# Patient Record
Sex: MALE | Race: WHITE | HISPANIC OR LATINO | Employment: UNEMPLOYED | URBAN - METROPOLITAN AREA
[De-identification: names, ages, dates, MRNs, and addresses within clinical notes are randomized per-mention and may not be internally consistent; named-entity substitution may affect disease eponyms.]

---

## 2023-04-15 ENCOUNTER — APPOINTMENT (EMERGENCY)
Dept: RADIOLOGY | Facility: HOSPITAL | Age: 38
End: 2023-04-15

## 2023-04-15 ENCOUNTER — HOSPITAL ENCOUNTER (INPATIENT)
Facility: HOSPITAL | Age: 38
LOS: 1 days | Discharge: HOME | End: 2023-04-17
Attending: EMERGENCY MEDICINE | Admitting: STUDENT IN AN ORGANIZED HEALTH CARE EDUCATION/TRAINING PROGRAM

## 2023-04-15 ENCOUNTER — APPOINTMENT (EMERGENCY)
Dept: CT IMAGING | Facility: HOSPITAL | Age: 38
End: 2023-04-15

## 2023-04-15 DIAGNOSIS — S36.039A SPLENIC LACERATION, INITIAL ENCOUNTER: ICD-10-CM

## 2023-04-15 DIAGNOSIS — S37.092A: ICD-10-CM

## 2023-04-15 DIAGNOSIS — S22.49XA MULTIPLE RIB FRACTURES: Primary | ICD-10-CM

## 2023-04-15 DIAGNOSIS — J93.9 PNEUMOTHORAX: ICD-10-CM

## 2023-04-15 LAB
ALBUMIN SERPL BCP-MCNC: 5.3 G/DL (ref 3.5–5)
ALP SERPL-CCNC: 54 U/L (ref 34–104)
ALT SERPL W P-5'-P-CCNC: 67 U/L (ref 7–52)
ANION GAP SERPL CALCULATED.3IONS-SCNC: 8 MMOL/L (ref 4–13)
AST SERPL W P-5'-P-CCNC: 50 U/L (ref 13–39)
BASOPHILS # BLD AUTO: 0.03 THOUSANDS/ΜL (ref 0–0.1)
BASOPHILS NFR BLD AUTO: 0 % (ref 0–1)
BILIRUB SERPL-MCNC: 1.27 MG/DL (ref 0.2–1)
BILIRUB UR QL STRIP: NEGATIVE
BUN SERPL-MCNC: 19 MG/DL (ref 5–25)
CALCIUM SERPL-MCNC: 10.1 MG/DL (ref 8.4–10.2)
CHLORIDE SERPL-SCNC: 101 MMOL/L (ref 96–108)
CLARITY UR: CLEAR
CO2 SERPL-SCNC: 27 MMOL/L (ref 21–32)
COLOR UR: ABNORMAL
CREAT SERPL-MCNC: 1.22 MG/DL (ref 0.6–1.3)
EOSINOPHIL # BLD AUTO: 0.01 THOUSAND/ΜL (ref 0–0.61)
EOSINOPHIL NFR BLD AUTO: 0 % (ref 0–6)
ERYTHROCYTE [DISTWIDTH] IN BLOOD BY AUTOMATED COUNT: 12.3 % (ref 11.6–15.1)
GFR SERPL CREATININE-BSD FRML MDRD: 76 ML/MIN/1.73SQ M
GLUCOSE SERPL-MCNC: 107 MG/DL (ref 65–140)
GLUCOSE UR STRIP-MCNC: NEGATIVE MG/DL
HCT VFR BLD AUTO: 45.4 % (ref 36.5–49.3)
HGB BLD-MCNC: 15.1 G/DL (ref 12–17)
HGB UR QL STRIP.AUTO: ABNORMAL
IMM GRANULOCYTES # BLD AUTO: 0.06 THOUSAND/UL (ref 0–0.2)
IMM GRANULOCYTES NFR BLD AUTO: 1 % (ref 0–2)
KETONES UR STRIP-MCNC: ABNORMAL MG/DL
LEUKOCYTE ESTERASE UR QL STRIP: NEGATIVE
LIPASE SERPL-CCNC: 24 U/L (ref 11–82)
LYMPHOCYTES # BLD AUTO: 1.19 THOUSANDS/ΜL (ref 0.6–4.47)
LYMPHOCYTES NFR BLD AUTO: 13 % (ref 14–44)
MCH RBC QN AUTO: 29.3 PG (ref 26.8–34.3)
MCHC RBC AUTO-ENTMCNC: 33.3 G/DL (ref 31.4–37.4)
MCV RBC AUTO: 88 FL (ref 82–98)
MONOCYTES # BLD AUTO: 1.06 THOUSAND/ΜL (ref 0.17–1.22)
MONOCYTES NFR BLD AUTO: 12 % (ref 4–12)
NEUTROPHILS # BLD AUTO: 6.61 THOUSANDS/ΜL (ref 1.85–7.62)
NEUTS SEG NFR BLD AUTO: 74 % (ref 43–75)
NITRITE UR QL STRIP: NEGATIVE
NRBC BLD AUTO-RTO: 0 /100 WBCS
PH UR STRIP.AUTO: 5.5 [PH]
PLATELET # BLD AUTO: 208 THOUSANDS/UL (ref 149–390)
PMV BLD AUTO: 9.7 FL (ref 8.9–12.7)
POTASSIUM SERPL-SCNC: 4 MMOL/L (ref 3.5–5.3)
PROT SERPL-MCNC: 8.4 G/DL (ref 6.4–8.4)
PROT UR STRIP-MCNC: ABNORMAL MG/DL
RBC # BLD AUTO: 5.16 MILLION/UL (ref 3.88–5.62)
SODIUM SERPL-SCNC: 136 MMOL/L (ref 135–147)
SP GR UR STRIP.AUTO: 1.02 (ref 1–1.03)
UROBILINOGEN UR STRIP-ACNC: 2 MG/DL
WBC # BLD AUTO: 8.96 THOUSAND/UL (ref 4.31–10.16)

## 2023-04-15 RX ORDER — KETOROLAC TROMETHAMINE 30 MG/ML
15 INJECTION, SOLUTION INTRAMUSCULAR; INTRAVENOUS ONCE
Status: DISCONTINUED | OUTPATIENT
Start: 2023-04-15 | End: 2023-04-15

## 2023-04-15 RX ORDER — KETOROLAC TROMETHAMINE 30 MG/ML
15 INJECTION, SOLUTION INTRAMUSCULAR; INTRAVENOUS ONCE
Status: COMPLETED | OUTPATIENT
Start: 2023-04-15 | End: 2023-04-15

## 2023-04-15 RX ORDER — ACETAMINOPHEN 325 MG/1
975 TABLET ORAL ONCE
Status: COMPLETED | OUTPATIENT
Start: 2023-04-15 | End: 2023-04-15

## 2023-04-15 RX ADMIN — IOHEXOL 100 ML: 350 INJECTION, SOLUTION INTRAVENOUS at 23:47

## 2023-04-15 RX ADMIN — SODIUM CHLORIDE 1000 ML: 0.9 INJECTION, SOLUTION INTRAVENOUS at 23:00

## 2023-04-15 RX ADMIN — ACETAMINOPHEN 975 MG: 325 TABLET ORAL at 23:03

## 2023-04-15 RX ADMIN — KETOROLAC TROMETHAMINE 15 MG: 30 INJECTION, SOLUTION INTRAMUSCULAR; INTRAVENOUS at 23:35

## 2023-04-16 PROBLEM — S36.039A SPLEEN LACERATION, INITIAL ENCOUNTER: Status: ACTIVE | Noted: 2023-04-16

## 2023-04-16 PROBLEM — R31.9 HEMATURIA: Status: ACTIVE | Noted: 2023-04-16

## 2023-04-16 PROBLEM — S22.42XA MULTIPLE FRACTURES OF RIBS, LEFT SIDE, INITIAL ENCOUNTER FOR CLOSED FRACTURE: Status: ACTIVE | Noted: 2023-04-16

## 2023-04-16 PROBLEM — S27.0XXA TRAUMATIC PNEUMOTHORAX: Status: ACTIVE | Noted: 2023-04-16
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
## 2023-04-16 LAB
ABO GROUP BLD: NORMAL
ABO GROUP BLD: NORMAL
ALBUMIN SERPL BCP-MCNC: 4.3 G/DL (ref 3.5–5)
ALP SERPL-CCNC: 45 U/L (ref 34–104)
ALT SERPL W P-5'-P-CCNC: 49 U/L (ref 7–52)
ANION GAP SERPL CALCULATED.3IONS-SCNC: 6 MMOL/L (ref 4–13)
APTT PPP: 27 SECONDS (ref 23–37)
AST SERPL W P-5'-P-CCNC: 36 U/L (ref 13–39)
BACTERIA UR QL AUTO: ABNORMAL /HPF
BASOPHILS # BLD AUTO: 0.02 THOUSANDS/ΜL (ref 0–0.1)
BASOPHILS NFR BLD AUTO: 0 % (ref 0–1)
BILIRUB SERPL-MCNC: 1.57 MG/DL (ref 0.2–1)
BLD GP AB SCN SERPL QL: NEGATIVE
BUN SERPL-MCNC: 17 MG/DL (ref 5–25)
CALCIUM SERPL-MCNC: 9 MG/DL (ref 8.4–10.2)
CHLORIDE SERPL-SCNC: 105 MMOL/L (ref 96–108)
CO2 SERPL-SCNC: 26 MMOL/L (ref 21–32)
CREAT SERPL-MCNC: 1.04 MG/DL (ref 0.6–1.3)
EOSINOPHIL # BLD AUTO: 0.04 THOUSAND/ΜL (ref 0–0.61)
EOSINOPHIL NFR BLD AUTO: 1 % (ref 0–6)
ERYTHROCYTE [DISTWIDTH] IN BLOOD BY AUTOMATED COUNT: 12.6 % (ref 11.6–15.1)
GFR SERPL CREATININE-BSD FRML MDRD: 92 ML/MIN/1.73SQ M
GLUCOSE SERPL-MCNC: 105 MG/DL (ref 65–140)
HCT VFR BLD AUTO: 40.1 % (ref 36.5–49.3)
HCT VFR BLD AUTO: 40.4 % (ref 36.5–49.3)
HCT VFR BLD AUTO: 40.5 % (ref 36.5–49.3)
HGB BLD-MCNC: 13.3 G/DL (ref 12–17)
HGB BLD-MCNC: 13.4 G/DL (ref 12–17)
HGB BLD-MCNC: 13.5 G/DL (ref 12–17)
IMM GRANULOCYTES # BLD AUTO: 0.02 THOUSAND/UL (ref 0–0.2)
IMM GRANULOCYTES NFR BLD AUTO: 0 % (ref 0–2)
INR PPP: 0.98 (ref 0.84–1.19)
LYMPHOCYTES # BLD AUTO: 1.57 THOUSANDS/ΜL (ref 0.6–4.47)
LYMPHOCYTES NFR BLD AUTO: 27 % (ref 14–44)
MAGNESIUM SERPL-MCNC: 2.1 MG/DL (ref 1.9–2.7)
MCH RBC QN AUTO: 29.3 PG (ref 26.8–34.3)
MCHC RBC AUTO-ENTMCNC: 33.2 G/DL (ref 31.4–37.4)
MCV RBC AUTO: 88 FL (ref 82–98)
MONOCYTES # BLD AUTO: 0.85 THOUSAND/ΜL (ref 0.17–1.22)
MONOCYTES NFR BLD AUTO: 15 % (ref 4–12)
MUCOUS THREADS UR QL AUTO: ABNORMAL
NEUTROPHILS # BLD AUTO: 3.29 THOUSANDS/ΜL (ref 1.85–7.62)
NEUTS SEG NFR BLD AUTO: 57 % (ref 43–75)
NON-SQ EPI CELLS URNS QL MICRO: ABNORMAL /HPF
NRBC BLD AUTO-RTO: 0 /100 WBCS
PHOSPHATE SERPL-MCNC: 3.8 MG/DL (ref 2.7–4.5)
PLATELET # BLD AUTO: 177 THOUSANDS/UL (ref 149–390)
PMV BLD AUTO: 9.4 FL (ref 8.9–12.7)
POTASSIUM SERPL-SCNC: 3.9 MMOL/L (ref 3.5–5.3)
PROT SERPL-MCNC: 6.7 G/DL (ref 6.4–8.4)
PROTHROMBIN TIME: 13.2 SECONDS (ref 11.6–14.5)
RBC # BLD AUTO: 4.54 MILLION/UL (ref 3.88–5.62)
RBC #/AREA URNS AUTO: ABNORMAL /HPF
RH BLD: POSITIVE
RH BLD: POSITIVE
SODIUM SERPL-SCNC: 137 MMOL/L (ref 135–147)
SPECIMEN EXPIRATION DATE: NORMAL
WBC # BLD AUTO: 5.79 THOUSAND/UL (ref 4.31–10.16)
WBC #/AREA URNS AUTO: ABNORMAL /HPF

## 2023-04-16 RX ORDER — OXYCODONE HYDROCHLORIDE 5 MG/1
5 TABLET ORAL EVERY 4 HOURS PRN
Status: DISCONTINUED | OUTPATIENT
Start: 2023-04-16 | End: 2023-04-17 | Stop reason: HOSPADM

## 2023-04-16 RX ORDER — METHOCARBAMOL 500 MG/1
500 TABLET, FILM COATED ORAL EVERY 6 HOURS PRN
Status: DISCONTINUED | OUTPATIENT
Start: 2023-04-16 | End: 2023-04-17 | Stop reason: HOSPADM

## 2023-04-16 RX ORDER — OXYCODONE HYDROCHLORIDE 5 MG/1
5 TABLET ORAL EVERY 4 HOURS PRN
Status: DISCONTINUED | OUTPATIENT
Start: 2023-04-16 | End: 2023-04-16

## 2023-04-16 RX ORDER — ONDANSETRON 2 MG/ML
4 INJECTION INTRAMUSCULAR; INTRAVENOUS EVERY 4 HOURS PRN
Status: DISCONTINUED | OUTPATIENT
Start: 2023-04-16 | End: 2023-04-17 | Stop reason: HOSPADM

## 2023-04-16 RX ORDER — AMOXICILLIN 250 MG
1 CAPSULE ORAL
Status: DISCONTINUED | OUTPATIENT
Start: 2023-04-16 | End: 2023-04-17 | Stop reason: HOSPADM

## 2023-04-16 RX ORDER — HYDROMORPHONE HCL IN WATER/PF 6 MG/30 ML
0.2 PATIENT CONTROLLED ANALGESIA SYRINGE INTRAVENOUS EVERY 2 HOUR PRN
Status: DISCONTINUED | OUTPATIENT
Start: 2023-04-16 | End: 2023-04-17 | Stop reason: HOSPADM

## 2023-04-16 RX ORDER — ENOXAPARIN SODIUM 100 MG/ML
30 INJECTION SUBCUTANEOUS EVERY 12 HOURS SCHEDULED
Status: DISCONTINUED | OUTPATIENT
Start: 2023-04-16 | End: 2023-04-17 | Stop reason: HOSPADM

## 2023-04-16 RX ORDER — CHLORHEXIDINE GLUCONATE 0.12 MG/ML
15 RINSE ORAL EVERY 12 HOURS SCHEDULED
Status: DISCONTINUED | OUTPATIENT
Start: 2023-04-16 | End: 2023-04-16

## 2023-04-16 RX ORDER — ACETAMINOPHEN 325 MG/1
975 TABLET ORAL EVERY 8 HOURS SCHEDULED
Status: DISCONTINUED | OUTPATIENT
Start: 2023-04-16 | End: 2023-04-17 | Stop reason: HOSPADM

## 2023-04-16 RX ADMIN — METHOCARBAMOL 500 MG: 500 TABLET ORAL at 10:27

## 2023-04-16 RX ADMIN — ACETAMINOPHEN 975 MG: 325 TABLET ORAL at 22:40

## 2023-04-16 RX ADMIN — SENNOSIDES AND DOCUSATE SODIUM 1 TABLET: 8.6; 5 TABLET ORAL at 22:41

## 2023-04-16 RX ADMIN — ENOXAPARIN SODIUM 30 MG: 30 INJECTION SUBCUTANEOUS at 22:40

## 2023-04-16 RX ADMIN — ACETAMINOPHEN 975 MG: 325 TABLET ORAL at 05:49

## 2023-04-16 RX ADMIN — Medication 2.5 MG: at 10:27

## 2023-04-16 RX ADMIN — Medication 2.5 MG: at 22:40

## 2023-04-16 RX ADMIN — CHLORHEXIDINE GLUCONATE 0.12% ORAL RINSE 15 ML: 1.2 LIQUID ORAL at 08:57

## 2023-04-16 RX ADMIN — ACETAMINOPHEN 975 MG: 325 TABLET ORAL at 14:10

## 2023-04-16 NOTE — ED ATTENDING ATTESTATION
4/15/2023  I, Keny Chacon MD, saw and evaluated the patient  I have discussed the patient with the resident/non-physician practitioner and agree with the resident's/non-physician practitioner's findings, Plan of Care, and MDM as documented in the resident's/non-physician practitioner's note, except where noted  All available labs and Radiology studies were reviewed  I was present for key portions of any procedure(s) performed by the resident/non-physician practitioner and I was immediately available to provide assistance  At this point I agree with the current assessment done in the Emergency Department  I have conducted an independent evaluation of this patient a history and physical is as follows: Patient is a 30year old male who fell down a flight of wooden stairs today  No LOC  (+) left rib pain and left flank pain  Td UTD  No head injury  No recent old records from this ED seen on [employer] system  [facility] checked on this patient and no Rx found  NCAT  Neck supple  Lungs clear  Heart tachy without murmur  Abdomen with left sided tenderness and L CVAT  (+) left posterolateral lower rib tenderness with multiple ecchymoses and abrasions noted  No LE edema  No focal deficits  DDx including but not limited to: Doubt intracranial injury, concussion, cervical injury; intrathoracic injury, intraabdominal injury; doubt extremity injury--fracture, dislocation, strain, sprain, contusion  Will check labs, x-ray and CT       ED Course         Critical Care Time  Procedures

## 2023-04-16 NOTE — ASSESSMENT & PLAN NOTE
- Hematuria on UA  - Concern for left perinephric hematoma on CT imaging without renal laceration or contusion  - Bladder wall thickening on CT imaging  - Urology consult placed  - Strict I/O  - Urinary retention protocol  - Consider 3-way vaughan insertion to monitor for clots

## 2023-04-16 NOTE — ASSESSMENT & PLAN NOTE
· 2/2 to mechanical fall  Presents with tenderness over left lateral rib cage and abrasions over left lateral lower ribs  No bruising noted  · CT c/a/p: with multiple Left-sided rib fractures at 6th anterolateral aspect and 10th - 12th posterior aspect  · Rib fracture protocol  · Multimodal pain regimen with tylenol ATC, and as needed robaxin, oxycodone, and dilaudid  · PIC score  · Consider APS consult if indicated, currently states pain is well controlled    · Pulmonary hygiene: OOB daily, IS, cough and deep breathe  · IS q1h while awake, currently pulling 1500mL  · Maintain SpO2 > 94%  · F/u CXR this am at 0600 pending  · PT/OT  · Will need out-pt f/u with trauma in 2 weeks

## 2023-04-16 NOTE — H&P
[facility]  H&P  Name: Carlos Grewal 30 y o  male I MRN: [id number]  Unit/Bed#: ED-07 I Date of Admission: 4/15/2023   Date of Service: 4/16/2023 I Hospital Day: 0      Assessment/Plan   Traumatic pneumothorax  Assessment & Plan  - Tiny traumatic pneumothorax identified on CT CAP  - Repeat CXR 4/16  - Currently on room air breathing well without any issues  - Supplemental O2 as needed  - Rib fracture protocol  - Follow up in trauma clinic in 2 weeks    Hematuria  Assessment & Plan  - Hematuria on UA  - Concern for left perinephric hematoma on CT imaging without renal laceration or contusion  - Bladder wall thickening on CT imaging  - Urology consult placed  - Strict I/O  - Urinary retention protocol  - Consider 3-way vaughan insertion to monitor for clots    Fall (on) (from) other stairs and steps, initial encounter  Assessment & Plan  - Mechanical fall down 11 wooden stairs  - Admit to critical care for injuries as listed  - Pt is ambulating without assistance in the ED    Multiple fractures of ribs, left side, initial encounter for closed fracture  Assessment & Plan  - Multiple left-sided rib fractures (6, 10-12), present on admission   - Continue rib fracture protocol   - Continue to encourage incentive spirometer use and adequate pulmonary hygiene  Currently pulling 1500 mL on I S   - PIC score   - Continue multimodal analgesic regimen    - Supplemental oxygen via nasal cannula as needed to maintain saturations greater than or equal to 94%  - Repeat chest x-ray 4/16 evening  - PT and OT evaluation and treatment as indicated  - Outpatient follow-up in the trauma clinic for re-evaluation in approximately 2 weeks  * Spleen laceration, initial encounter  Assessment & Plan  - Two splenic lacerations, present on admission   - No active extravasation on CT imaging including delays  No IR intervention needed at this time    - S/p fall down about 11 wooden stairs  - Continue to monitor abdominal exam  Currently tender left flank, nontender abdomen  - Regular diet  - Continue to monitor hemoglobin Q6hrs  No evidence of active or ongoing bleeding at this time  - Continue multimodal analgesic regimen    - Continue to encourage incentive spirometer use and adequate pulmonary hygiene  Currently pulling 1500 mL on I S   - May resume light activity as tolerated while following solid organ injury precautions:  Avoid lifting greater than 10 pounds, any strenuous activities and/or exercise, and contact sports until cleared by the trauma service  Avoid driving and crowded places until cleared by the trauma service  - PT and OT evaluation and treatment as indicated  - Outpatient follow-up in the trauma clinic for re-evaluation in approximately 2 weeks  Trauma Alert: Evaluation; trauma team notified at [phone] via phone   Model of Arrival: Self    Trauma Team: Attending Allan Burroughs and BLANE Sloan  Consultants: Urology    History of Present Illness     Chief Complaint: Fall  Mechanism:Fall     HPI:    Mickey Aguiar is a 30 y o  male who presents after falling down 11 wooden stairs  Pt is primarily [place] speaking and the [employer] phone line did not have a Romanian  available, so patient's young daughter at bedside helped with translation  She reports that patient slipped on the stairs and fell down and he is mostly having left sided rib pain  Patient reports he did not hit his head, lose consciousness, or use anticoagulation or antiplatelet medications  Review of Systems   HENT: Negative for facial swelling  Eyes: Negative for photophobia  Respiratory: Negative for shortness of breath  Cardiovascular: Negative for chest pain  Gastrointestinal: Negative for abdominal pain and nausea  Genitourinary: Positive for hematuria  Musculoskeletal: Positive for arthralgias, back pain and myalgias  Negative for neck pain          Left sided and posterior rib pain   Skin: Negative for wound  Neurological: Negative for dizziness, syncope, weakness, light-headedness, numbness and headaches  Psychiatric/Behavioral: Negative for confusion  All other systems reviewed and are negative  12-point, complete review of systems was reviewed and negative except as stated above  Historical Information     History reviewed  No pertinent past medical history  History reviewed  No pertinent surgical history  Social History     Tobacco Use   • Smoking status: Never   • Smokeless tobacco: Never   Substance Use Topics   • Alcohol use: Yes   • Drug use: Never       There is no immunization history on file for this patient  Last Tetanus: reports that he is up to date and had it 1 5 yrs ago  Family History: Non-contributory     Meds/Allergies   all current active meds have been reviewed   No Known Allergies    Objective   Initial Vitals:   Temperature: 99 7 °F (37 6 °C) (04/15/23 1955)  Pulse: 102 (04/15/23 1953)  Respirations: 18 (04/15/23 1953)  Blood Pressure: 138/72 (04/15/23 1953)    Primary Survey:   Airway:        Status: patent;        Pre-hospital Interventions: none        Hospital Interventions: none  Breathing:        Pre-hospital Interventions: none       Effort: normal       Right breath sounds: normal       Left breath sounds: normal  Circulation:        Rhythm: regular       Rate: regular   Right Pulses Left Pulses    R radial: 2+    R pedal: 2+     L radial: 2+    L pedal: 2+       Disability:        GCS: Eye: 4; Verbal: 5 Motor: 6 Total: 15       Right Pupil: 4 mm;  round;  reactive         Left Pupil:  4 mm;  round;  reactive      R Motor Strength L Motor Strength    R : 5/5  R dorsiflex: 5/5  R plantarflex: 5/5 L : 5/5  L dorsiflex: 5/5  L plantarflex: 5/5        Sensory:  No sensory deficit  Exposure:       Completed: Yes      Secondary Survey:  Physical Exam  Vitals reviewed     Constitutional:       General: He is not in acute distress  Appearance: Normal appearance  HENT:      Head: Normocephalic and atraumatic  Right Ear: External ear normal       Left Ear: External ear normal       Nose: Nose normal       Mouth/Throat:      Mouth: Mucous membranes are moist       Pharynx: Oropharynx is clear  Eyes:      Extraocular Movements: Extraocular movements intact  Conjunctiva/sclera: Conjunctivae normal       Pupils: Pupils are equal, round, and reactive to light  Cardiovascular:      Rate and Rhythm: Normal rate and regular rhythm  Pulses: Normal pulses  Heart sounds: Normal heart sounds  Pulmonary:      Effort: Pulmonary effort is normal  No respiratory distress  Breath sounds: Normal breath sounds  Comments: Left chest wall tenderness  Chest:      Chest wall: Tenderness present  Abdominal:      General: Abdomen is flat  Bowel sounds are normal  There is no distension  Palpations: Abdomen is soft  There is no mass  Tenderness: There is no abdominal tenderness  There is left CVA tenderness  There is no right CVA tenderness, guarding or rebound  Hernia: No hernia is present  Comments: Left flank tenderness and overlying abrasion/ erythema/ ecchymosis   Musculoskeletal:         General: No swelling, tenderness, deformity or signs of injury  Normal range of motion  Cervical back: Normal range of motion and neck supple  No rigidity or tenderness  Skin:     General: Skin is warm and dry  Capillary Refill: Capillary refill takes less than 2 seconds  Findings: No bruising or lesion  Neurological:      General: No focal deficit present  Mental Status: He is alert and oriented to person, place, and time  Mental status is at baseline  Sensory: No sensory deficit  Motor: No weakness     Psychiatric:         Mood and Affect: Mood normal          Behavior: Behavior normal          Invasive Devices     Peripheral Intravenous Line  Duration           Peripheral "IV 04/15/23 Right Antecubital <1 day              Lab Results:   Results: I have personally reviewed all pertinent laboratory/tests results, BMP/CMP:   Lab Results   Component Value Date    SODIUM 136 04/15/2023    K 4 0 04/15/2023     04/15/2023    CO2 27 04/15/2023    BUN 19 04/15/2023    CREATININE 1 22 04/15/2023    CALCIUM 10 1 04/15/2023    AST 50 (H) 04/15/2023    ALT 67 (H) 04/15/2023    ALKPHOS 54 04/15/2023    EGFR 76 04/15/2023    and CBC:   Lab Results   Component Value Date    WBC 8 96 04/15/2023    HGB 13 4 04/16/2023    HCT 40 5 04/16/2023    MCV 88 04/15/2023     04/15/2023    MCH 29 3 04/15/2023    MCHC 33 3 04/15/2023    RDW 12 3 04/15/2023    MPV 9 7 04/15/2023    NRBC 0 04/15/2023       Imaging Results: I have personally reviewed pertinent reports  Chest Xray(s): pending   FAST exam(s): N/A   CT Scan(s): positive for acute findings: L rib fx, PTX; splenic and L renal injuries   Additional Xray(s): N/A     Other Studies:   CT chest abdomen pelvis w contrast   ED Interpretation by Ross Edmondson MD (04/16 0240)   Addendum created by Jodi Smiley MD on 4/16/2023 2:13 AM Johnye Ashley Time (Raimundo Reddy [id number]):  THIS REPORT CONTAINS FINDINGS THAT MAY BE CRITICAL TO PATIENT CARE  Findings  were communicated via telephone with Villa Stearns at 2:13 AM EDT on 4/16/2023  These findings were acknowledged and understood  Addendum created by Jodi Smiley MD on 4/16/2023 2:07 AM Johnye Ashley Time (Raimundo Reddy [id number]): There was a software mapping error in the IMPRESSION section of the original report  It should also contain the following sentence:  \"Four separate acute left rib fractures  \"  Initial Report created on 4/16/2023 2:05 AM Johnye Ashley Time (Raimundo Reddy [id number]):  PROCEDURE INFORMATION:  Exam: CT Chest With Contrast; Diagnostic  Exam date and time: 4/15/2023 11:45 PM  Age: 30years old  Clinical indication: Pain and injury or trauma; Fall; Luq; Abdominal pain;  Localized; Blunt trauma  (contusions or hematomas); " Chest wall pain and left-sided  TECHNIQUE:  Imaging protocol: Diagnostic computed tomography of the chest with c   ontrast   3D rendering (Not supervised by radiologist): MIP and/or 3D reconstructed images were created  by the technologist   Contrast material: OMNI 350; Contrast volume: 100 ml; Contrast route: INTRAVENOUS (IV);  COMPARISON:  DX XR RIBS LEFT W PA CHEST MIN 3 VWS 4/15/2023 11:06 PM  FINDINGS:  Lungs: No traumatic lung contusion or a discrete lung laceration  Pleural spaces: 2 separate subcentimeter foci of gas within the left pleura (series 301, image 72),  consistent with minimal pneumothoraces (in aggregate measuring less than 1% in volume)  No  pleural effusion to suggest hemothorax  No right pneumothorax  Heart: Unremarkable  No cardiomegaly  No pericardial effusion  Lymph nodes: Unremarkable  No enlarged lymph nodes  Vasculature: Although this study is not a dedicated PE-protocol CT angiogram, no central/large filling  defect in the pulmonary arteries to suggest PE  No aortic dissection  Bones/joints: Acute nondisplaced fractures in the posterior aspect of the left 10th th   rough 12th ribs  as well as another in the anterolateral aspect of the left 6th rib  Shira Márquez Accession: [id number] MRN: [id number]  Preliminary  Radiology Report  Page 2 of 3  Soft tissues: Unremarkable  IMPRESSION:  Two separate subcentimeter foci of gas within the left pleura (series 301, image 72), consistent with  minimal pneumothoraces (in aggregate measuring less than 1% in volume)   =========================  PROCEDURE INFORMATION:  Exam: CT Abdomen And Pelvis With Contrast  Exam date and time: 4/15/2023 11:45 PM  Age: 30years old  Clinical indication: Pain and injury or trauma; Fall; Luq; Abdominal pain; Localized; Blunt trauma  (contusions or hematomas);  Chest wall pain and left-sided  TECHNIQUE:  Imaging protocol: Computed tomography of the abdomen and pelvis with contrast   3D "rendering (Not supervised by radiologist): MIP and/or 3D reconstructed images were created  by the technologist   Contrast material: OMNI 350; Contrast volume: 100 ml; Contra   st route: INTRAVENOUS (IV);  COMPARISON:  DX XR RIBS LEFT W PA CHEST MIN 3 VWS 4/15/2023 11:06 PM  FINDINGS:  Liver: No mass or fluid collection  Gallbladder and bile ducts: No acute abnormality in this region  Pancreas: No acute inflamm  , mass or duct dilation  Spleen: Two separate lacerations in the spleen, larger one measuring 1 4 cm and the other one is  smaller, amorphous, series 301, images 82 to 85  Adrenal glands: Normal  No mass  Kidneys and ureters: Trace left perirenal fluid with no acute abnormality of the subjacent left kidney  (no renal contusion or laceration)  Right kidney is unremarkable  Stomach and bowel: No obstruction or mucosal thickening  Appendix: No abnormality of the well visualized appendix  Intraperitoneal space: No free air  No significant fluid collection  Vasculature: No AAA  Lymph nodes: No adenopathy  Urinary bladder: There is relative bladder wall thickening, which may be related to chronic partial  bladder outlet obstruction (e g  associate   d with the prostate); however, correlation with urinalysis may  be considered to exclude the possibility of cystitis (if clinically indicated)  Reproductive: See \"Urinary bladder\" finding  Bones/joints: No acute fracture or dislocation  Soft tissues: Unremarkable  Chuck Ruiz Accession: [id number] MRN: [id number]  Preliminary  Radiology Report   (QA) DISCREPANCY? If there is a discrepancy between the preliminary and final interpretation, please notify [employer] via [email or web address]    If you do not have access to our QA portal, call our QA team at [phone]  This report is intended only for the use of the referring physician, and only in accordance with law, If you received this in error, " call [phone]  Page 3 of 3  IMPRESSION:  1  Two separate lacerations in the spleen, larger one measuring 1 4 cm and the other one is smaller,  amorphous, series 301, images 82 to 85   2  Trace left perirenal fluid with no acu   te abnormality of the subjacent left kidney (no renal contusion  or laceration)  3  Bladder wall thickening, a nonspecific finding  Thank you for allowing us to participate in the care of your patient  Dictated and Authenticated by: Kalyan Avelar MD  04/16/2023 2:05 AM Eastern Time (Raimundo [address] 1159      XR ribs left w pa chest min 3 views    (Results Pending)         Code Status: No Order  Advance Directive and Living Will:      Power of :    POLST:    I have spent 30 minutes with Patient and family today in which greater than 50% of this time was spent in counseling/coordination of care regarding Diagnostic results, Prognosis, Risks and benefits of tx options, Instructions for management, Patient and family education, Importance of tx compliance, Risk factor reductions, Impressions, Counseling / Coordination of care, Documenting in the medical record, Reviewing / ordering tests, medicine, procedures  , Obtaining or reviewing history   and Communicating with other healthcare professionals

## 2023-04-16 NOTE — ASSESSMENT & PLAN NOTE
· 2/2 mechanical fall   · On Exam surface abrasions noted over left flank, no evidence of bruising Franky's or [place] sign  · CT c/a/p: two small splenic lacerations   Per radiology -- no active extravasation noted on CT or CT delays  · Hgb on presentation 15, repeat 13  · No current need for IR intervention  · Trend Hgb q6h  · NPO for now, until Hgb is established as stable  · Pain control as above  · Pulmonary hygiene as above  · PT/OT  · Serial abd exams

## 2023-04-16 NOTE — PLAN OF CARE
Problem: PAIN - ADULT  Goal: Verbalizes/displays adequate comfort level or baseline comfort level  Description: Interventions:  - Encourage patient to monitor pain and request assistance  - Assess pain using appropriate pain scale  - Administer analgesics based on type and severity of pain and evaluate response  - Implement non-pharmacological measures as appropriate and evaluate response  - Consider cultural and social influences on pain and pain management  - Notify physician/advanced practitioner if interventions unsuccessful or patient reports new pain  Outcome: Progressing     Problem: INFECTION - ADULT  Goal: Absence or prevention of progression during hospitalization  Description: INTERVENTIONS:  - Assess and monitor for signs and symptoms of infection  - Monitor lab/diagnostic results  - Monitor all insertion sites, i e  indwelling lines, tubes, and drains  - Monitor endotracheal if appropriate and nasal secretions for changes in amount and color  - [place] appropriate cooling/warming therapies per order  - Administer medications as ordered  - Instruct and encourage patient and family to use good hand hygiene technique  - Identify and instruct in appropriate isolation precautions for identified infection/condition  Outcome: Progressing     Problem: SAFETY ADULT  Goal: Patient will remain free of falls  Description: INTERVENTIONS:  - Educate patient/family on patient safety including physical limitations  - Instruct patient to call for assistance with activity   - Consult OT/PT to assist with strengthening/mobility   - Keep Call bell within reach  - Keep bed low and locked with side rails adjusted as appropriate  - Keep care items and personal belongings within reach  - Initiate and maintain comfort rounds  - Make Fall Risk Sign visible to staff  - Apply yellow socks and bracelet for high fall risk patients  - Consider moving patient to room near nurses station  Outcome: Progressing  Goal: Maintain or return to baseline ADL function  Description: INTERVENTIONS:  -  Assess patient's ability to carry out ADLs; assess patient's baseline for ADL function and identify physical deficits which impact ability to perform ADLs (bathing, care of mouth/teeth, toileting, grooming, dressing, etc )  - Assess/evaluate cause of self-care deficits   - Assess range of motion  - Assess patient's mobility; develop plan if impaired  - Assess patient's need for assistive devices and provide as appropriate  - Encourage maximum independence but intervene and supervise when necessary  - Involve family in performance of ADLs  - Assess for home care needs following discharge   - Consider OT consult to assist with ADL evaluation and planning for discharge  - Provide patient education as appropriate  Outcome: Progressing  Goal: Maintains/Returns to pre admission functional level  Description: INTERVENTIONS:  - Perform BMAT or MOVE assessment daily    - Set and communicate daily mobility goal to care team and patient/family/caregiver     - Collaborate with rehabilitation services on mobility goals if consulted  - Out of bed for toileting  - Record patient progress and toleration of activity level   Outcome: Progressing     Problem: DISCHARGE PLANNING  Goal: Discharge to home or other facility with appropriate resources  Description: INTERVENTIONS:  - Identify barriers to discharge w/patient and caregiver  - Arrange for needed discharge resources and transportation as appropriate  - Identify discharge learning needs (meds, wound care, etc )  - Arrange for interpretive services to assist at discharge as needed  - Refer to Case Management Department for coordinating discharge planning if the patient needs post-hospital services based on physician/advanced practitioner order or complex needs related to functional status, cognitive ability, or social support system  Outcome: Progressing     Problem: Knowledge Deficit  Goal: Patient/family/caregiver demonstrates understanding of disease process, treatment plan, medications, and discharge instructions  Description: Complete learning assessment and assess knowledge base    Interventions:  - Provide teaching at level of understanding  - Provide teaching via preferred learning methods  Outcome: Progressing     Problem: RESPIRATORY - ADULT  Goal: Achieves optimal ventilation and oxygenation  Description: INTERVENTIONS:  - Assess for changes in respiratory status  - Assess for changes in mentation and behavior  - Position to facilitate oxygenation and minimize respiratory effort  - Oxygen administered by appropriate delivery if ordered  - Initiate smoking cessation education as indicated  - Encourage broncho-pulmonary hygiene including cough, deep breathe, Incentive Spirometry  - Assess the need for suctioning and aspirate as needed  - Assess and instruct to report SOB or any respiratory difficulty  - Respiratory Therapy support as indicated  Outcome: Progressing     Problem: GENITOURINARY - ADULT  Goal: Maintains or returns to baseline urinary function  Description: INTERVENTIONS:  - Assess urinary function  - Encourage oral fluids to ensure adequate hydration if ordered  - Administer IV fluids as ordered to ensure adequate hydration  - Administer ordered medications as needed  - Offer frequent toileting  - Follow urinary retention protocol if ordered  Outcome: Progressing  Goal: Absence of urinary retention  Description: INTERVENTIONS:  - Assess patient’s ability to void and empty bladder  - Monitor I/O  - Bladder scan as needed  - Discuss with physician/AP medications to alleviate retention as needed  - Discuss catheterization for long term situations as appropriate  Outcome: Progressing     Problem: MUSCULOSKELETAL - ADULT  Goal: Maintain or return mobility to safest level of function  Description: INTERVENTIONS:  - Assess patient's ability to carry out ADLs; assess patient's baseline for ADL function and identify physical deficits which impact ability to perform ADLs (bathing, care of mouth/teeth, toileting, grooming, dressing, etc )  - Assess/evaluate cause of self-care deficits   - Assess range of motion  - Assess patient's mobility  - Assess patient's need for assistive devices and provide as appropriate  - Encourage maximum independence but intervene and supervise when necessary  - Involve family in performance of ADLs  - Assess for home care needs following discharge   - Consider OT consult to assist with ADL evaluation and planning for discharge  - Provide patient education as appropriate  Outcome: Progressing  Goal: Maintain proper alignment of affected body part  Description: INTERVENTIONS:  - Support, maintain and protect limb and body alignment  - Provide patient/ family with appropriate education  Outcome: Progressing

## 2023-04-16 NOTE — ASSESSMENT & PLAN NOTE
- Multiple left-sided rib fractures (6, 10-12), present on admission   - Continue rib fracture protocol   - Continue to encourage incentive spirometer use and adequate pulmonary hygiene  Currently pulling 1500 mL on I S   - PIC score   - Continue multimodal analgesic regimen    - Supplemental oxygen via nasal cannula as needed to maintain saturations greater than or equal to 94%  - Repeat chest x-ray 4/16 evening  - PT and OT evaluation and treatment as indicated  - Outpatient follow-up in the trauma clinic for re-evaluation in approximately 2 weeks

## 2023-04-16 NOTE — CONSULTS
[facility]  Consult  Name: Manuel Engel 30 y o  male I MRN: [id number]  Unit/Bed#: ICU 03 I Date of Admission: 4/15/2023   Date of Service: 4/16/2023 I Hospital Day: 0    Consults    Assessment/Plan   * Spleen laceration, initial encounter  Assessment & Plan  · 2/2 mechanical fall   · On Exam surface abrasions noted over left flank, no evidence of bruising Thurmond's or Tomasa Both sign  · CT c/a/p: two small splenic lacerations  Per radiology -- no active extravasation noted on CT or CT delays  · Hgb on presentation 15, repeat 13  · No current need for IR intervention  · Trend Hgb q6h  · NPO for now, until Hgb is established as stable  · Pain control as above  · Pulmonary hygiene as above  · PT/OT  · Serial abd exams    Multiple fractures of ribs, left side, initial encounter for closed fracture  Assessment & Plan  · 2/2 to mechanical fall  Presents with tenderness over left lateral rib cage and abrasions over left lateral lower ribs  No bruising noted  · CT c/a/p: with multiple Left-sided rib fractures at 6th anterolateral aspect and 10th - 12th posterior aspect  · Rib fracture protocol  · Multimodal pain regimen with tylenol ATC, and as needed robaxin, oxycodone, and dilaudid  · PIC score  · Consider APS consult if indicated, currently states pain is well controlled  · Pulmonary hygiene: OOB daily, IS, cough and deep breathe  · IS q1h while awake, currently pulling 1500mL  · Maintain SpO2 > 94%  · F/u CXR this am at 0600 pending  · PT/OT  · Will need out-pt f/u with trauma in 2 weeks    Fall (on) (from) other stairs and steps, initial encounter  Assessment & Plan  · Mechanical fall down 11 wooden stairs  · Injuries as below  · Fall precautions  · PT/OT    Hematuria  Assessment & Plan  · Noted on UA  · CT a/p: small fluid collection on Left kidney, concerning for hematoma and bladder wall thickening noted    (radiology interpretation: left perinephric fluid collection without laceration or contusion)  · Consult Urology, appreciate recommendations  · Plan for possible CBI, will need 3 way vaughan  · Strict I/O    Traumatic pneumothorax  Assessment & Plan  · 2/2 mechanical fall  · CT chest: Per radiology: Left subcentimeter foci of PTX (<1%)  · Currently on room air with SpO2 97%  · Maintain SpO2 > 94%  · Encourage IS           History of Present Illness     HPI: Farooq Iglesias is a 30 y o  who presents with no previous medical history  Last evening he suffered a mechanical fall at home down 11 wooded stairs  On arrival to the ER he was complaining of left-sided rib and flank pain  On exam he was noted to have multiple abrasions over his left flank, no Franky or Gray-Quijano sign noted  CT c/a/p revealed tiny left PTX, multiple left rib fractures, fluid collection on left kidney, and two small lacerations of the spleen  He states that pain is well controlled and is able to achieve 1500 mL on incentive spirometer  He was admitted to [facility] under Trauma/SCCS  History obtained from the patient with assistance of his daughter to translate as there were technical difficulties with the translation iPad  Review of Systems   Constitutional: Negative for chills, fatigue and fever  Eyes: Negative  Respiratory: Negative for cough, chest tightness, shortness of breath and wheezing  Chest wall tenderness  Cardiovascular: Negative for chest pain, palpitations and leg swelling  Gastrointestinal: Negative for abdominal distention, abdominal pain, diarrhea, nausea and vomiting  Endocrine: Negative  Genitourinary: Negative for difficulty urinating, frequency, hematuria and urgency  Musculoskeletal: Negative  Skin: Positive for wound (left-sided flank/rib abrasions)  Allergic/Immunologic: Negative  Neurological: Negative for dizziness, weakness, light-headedness and numbness  Hematological: Negative  Psychiatric/Behavioral: Negative  Historical Information   No past medical history on file  Past Surgical History:  No date: KNEE CARTILAGE SURGERY; Right   No current outpatient medications No Known Allergies   Social History     Tobacco Use   • Smoking status: Never   • Smokeless tobacco: Never   Vaping Use   • Vaping Use: Never used   Substance Use Topics   • Alcohol use: Yes   • Drug use: Never    History reviewed  No pertinent family history  Objective                            Vitals I/O      Most Recent Min/Max in 24hrs   Temp 98 7 °F (37 1 °C) Temp  Min: 98 7 °F (37 1 °C)  Max: 99 7 °F (37 6 °C)   Pulse 72 Pulse  Min: 72  Max: 102   Resp 18 Resp  Min: 18  Max: 18   /66 BP  Min: 116/61  Max: 138/72   O2 Sat 98 % SpO2  Min: 98 %  Max: 100 %      Intake/Output Summary (Last 24 hours) at 4/16/2023 4956  Last data filed at 4/16/2023 0131  Gross per 24 hour   Intake 1000 ml   Output --   Net 1000 ml         Diet NPO     Invasive Monitoring Physical exam    Physical Exam  Vitals and nursing note reviewed  Constitutional:       General: He is not in acute distress  Appearance: Normal appearance  He is normal weight  He is not ill-appearing or toxic-appearing  HENT:      Head: Normocephalic and atraumatic  Right Ear: External ear normal       Left Ear: External ear normal       Nose: Nose normal  No congestion or rhinorrhea  Mouth/Throat:      Mouth: Mucous membranes are moist       Pharynx: Oropharynx is clear  No oropharyngeal exudate or posterior oropharyngeal erythema  Eyes:      General: No scleral icterus  Extraocular Movements: Extraocular movements intact  Conjunctiva/sclera: Conjunctivae normal       Pupils: Pupils are equal, round, and reactive to light  Neck:      Vascular: No carotid bruit  Cardiovascular:      Rate and Rhythm: Normal rate and regular rhythm  Pulses: Normal pulses  Radial pulses are 2+ on the right side and 2+ on the left side          Dorsalis pedis pulses are 2+ on the right side and 2+ on the left side  Heart sounds: Normal heart sounds, S1 normal and S2 normal  No murmur heard  No friction rub  No gallop  Pulmonary:      Effort: Pulmonary effort is normal  No respiratory distress  Breath sounds: Normal breath sounds  No wheezing, rhonchi or rales  Abdominal:      General: Abdomen is flat  Bowel sounds are normal  There is no distension  Palpations: Abdomen is soft  Tenderness: There is no abdominal tenderness  Musculoskeletal:         General: No swelling or tenderness  Normal range of motion  Cervical back: Normal range of motion and neck supple  Right lower leg: No edema  Left lower leg: No edema  Lymphadenopathy:      Cervical: No cervical adenopathy  Skin:     General: Skin is warm and dry  Capillary Refill: Capillary refill takes less than 2 seconds  Coloration: Skin is not pale  Findings: Abrasion present  No bruising, erythema or rash  Neurological:      General: No focal deficit present  Mental Status: He is alert and oriented to person, place, and time  GCS: GCS eye subscore is 4  GCS verbal subscore is 5  GCS motor subscore is 6  Cranial Nerves: Cranial nerves 2-12 are intact  No cranial nerve deficit or dysarthria  Sensory: Sensation is intact  No sensory deficit  Motor: Motor function is intact  Psychiatric:         Mood and Affect: Mood normal          Behavior: Behavior normal          Thought Content: Thought content normal          Judgment: Judgment normal               Diagnostic Studies      EKG: sinus rhythm on telemetry  Imagin/15 CXR: multiple left-sided rib fractures  4/15 CT c/a/p: tiny (<1% left PTX), left anterolateral 6th rib fx, left posterior 10th - 12th rib fractures, small fluid collection on left kidney, two small splenic lacerations  I have personally reviewed pertinent reports     and I have personally reviewed pertinent films in PACS     Medications:  Scheduled PRN   acetaminophen, 975 mg, Q8H LIV  chlorhexidine, 15 mL, Q12H LIV  senna-docusate sodium, 1 tablet, HS      HYDROmorphone, 0 2 mg, Q2H PRN  methocarbamol, 500 mg, Q6H PRN  naloxone, 0 04 mg, Q1MIN PRN  ondansetron, 4 mg, Q4H PRN  oxyCODONE, 5 mg, Q4H PRN  oxyCODONE, 2 5 mg, Q4H PRN       Continuous          Labs:    CBC    Recent Labs     04/15/23  2304 04/16/23  0258 04/16/23  0547   WBC 8 96  --  5 79   HGB 15 1 13 4 13 3   HCT 45 4 40 5 40 1     --  177     BMP    Recent Labs     04/15/23  2304 04/16/23  0547   SODIUM 136 137   K 4 0 3 9    105   CO2 27 26   AGAP 8 6   BUN 19 17   CREATININE 1 22 1 04   CALCIUM 10 1 9 0       Coags    Recent Labs     04/16/23  0547   INR 0 98   PTT 27        Additional Electrolytes  Recent Labs     04/16/23  0547   MG 2 1   PHOS 3 8          Blood Gas    No recent results  No recent results LFTs  Recent Labs     04/15/23  2304 04/16/23  0547   ALT 67* 49   AST 50* 36   ALKPHOS 54 45   ALB 5 3* 4 3   TBILI 1 27* 1 57*       Infectious  No recent results  Glucose  Recent Labs     04/15/23  2304 04/16/23  0547   GLUC 107 105             Critical Care Time Delivered: Upon my evaluation, this patient had a high probability of imminent or life-threatening deterioration due to splenic laceration, multiple rib fractures  , which required my direct attention, intervention, and personal management  I have personally provided 40 minutes of critical care time, exclusive of procedures, teaching, family meetings, and any prior time recorded by providers other than myself     Cherie Shells

## 2023-04-16 NOTE — ASSESSMENT & PLAN NOTE
- Tiny traumatic pneumothorax identified on CT CAP  - Repeat CXR 4/16  - Currently on room air breathing well without any issues  - Supplemental O2 as needed  - Rib fracture protocol  - Follow up in trauma clinic in 2 weeks

## 2023-04-16 NOTE — ASSESSMENT & PLAN NOTE
- Mechanical fall down 11 wooden stairs  - Admit to critical care for injuries as listed  - Pt is ambulating without assistance in the ED

## 2023-04-16 NOTE — ASSESSMENT & PLAN NOTE
- Two splenic lacerations, present on admission   - No active extravasation on CT imaging including delays  No IR intervention needed at this time  - S/p fall down about 11 wooden stairs  - Continue to monitor abdominal exam  Currently tender left flank, nontender abdomen  - Regular diet  - Continue to monitor hemoglobin Q6hrs  No evidence of active or ongoing bleeding at this time  - Continue multimodal analgesic regimen    - Continue to encourage incentive spirometer use and adequate pulmonary hygiene  Currently pulling 1500 mL on I S   - May resume light activity as tolerated while following solid organ injury precautions:  Avoid lifting greater than 10 pounds, any strenuous activities and/or exercise, and contact sports until cleared by the trauma service  Avoid driving and crowded places until cleared by the trauma service  - PT and OT evaluation and treatment as indicated  - Outpatient follow-up in the trauma clinic for re-evaluation in approximately 2 weeks

## 2023-04-16 NOTE — ASSESSMENT & PLAN NOTE
· Noted on UA  · CT a/p: small fluid collection on Left kidney, concerning for hematoma and bladder wall thickening noted    (radiology interpretation: left perinephric fluid collection without laceration or contusion)  · Consult Urology, appreciate recommendations  · Plan for possible CBI, will need 3 way vaughan  · Strict I/O

## 2023-04-16 NOTE — ED PROVIDER NOTES
History  Chief Complaint   Patient presents with   • Fall     Pt presents after a fall down stairs hit left ribs, elbow and buttocks  Pt states he slipped when he fell  Denies LOC or head strike  28-year-old male presenting after fall downstairs with left sided chest, abdomen, pelvis pain, denies head strike, LOC  Patient states he slipped on wooden stairs today and fell on his left side down the stairs mainly landing on his hip and his left flank after which she slid down the stairs  Patient is denying any upper extremity or lower extremity pain or limited range of motion  Patient states his tetanus was updated 1-1/2 years ago  Patient denies headache, changes in vision, dizziness, lightheadedness, neck pain, shortness of breath, abdominal pain  None       History reviewed  No pertinent past medical history  Past Surgical History:   Procedure Laterality Date   • KNEE CARTILAGE SURGERY Right        History reviewed  No pertinent family history  I have reviewed and agree with the history as documented  E-Cigarette/Vaping   • E-Cigarette Use Never User      E-Cigarette/Vaping Substances     Social History     Tobacco Use   • Smoking status: Never   • Smokeless tobacco: Never   Vaping Use   • Vaping Use: Never used   Substance Use Topics   • Alcohol use: Yes   • Drug use: Never        Review of Systems   Constitutional: Negative for chills and fever  Eyes: Negative for pain and visual disturbance  Respiratory: Negative for cough and shortness of breath  Cardiovascular: Positive for chest pain (Left side from fall)  Negative for palpitations  Gastrointestinal: Negative for abdominal pain and vomiting  Genitourinary: Positive for flank pain (From fall)  Negative for dysuria and hematuria  Musculoskeletal: Negative for arthralgias, back pain, neck pain and neck stiffness  Skin: Positive for wound (Scratches on left side from chest down to buttocks)   Negative for color change and rash    Neurological: Negative for dizziness, seizures, syncope, weakness, light-headedness and headaches  All other systems reviewed and are negative  Physical Exam  ED Triage Vitals   Temperature Pulse Respirations Blood Pressure SpO2   04/15/23 1955 04/15/23 1953 04/15/23 1953 04/15/23 1953 04/15/23 1953   99 7 °F (37 6 °C) 102 18 138/72 98 %      Temp Source Heart Rate Source Patient Position - Orthostatic VS BP Location FiO2 (%)   04/15/23 1955 04/15/23 1953 04/15/23 1953 04/15/23 1953 --   Oral Monitor Lying Left arm       Pain Score       04/15/23 2335       8             Orthostatic Vital Signs  Vitals:    04/16/23 0230 04/16/23 0300 04/16/23 0400 04/16/23 0500   BP: 116/61 123/71 118/66    Pulse: 74 74 72    Patient Position - Orthostatic VS: Lying   Lying       Physical Exam  Vitals and nursing note reviewed  Constitutional:       General: He is in acute distress  Appearance: He is well-developed  HENT:      Head: Normocephalic and atraumatic  Nose: Nose normal  No congestion  Mouth/Throat:      Pharynx: Oropharynx is clear  Eyes:      Extraocular Movements: Extraocular movements intact  Conjunctiva/sclera: Conjunctivae normal    Cardiovascular:      Rate and Rhythm: Normal rate and regular rhythm  Pulses: Normal pulses  Heart sounds: Normal heart sounds  No murmur heard  Pulmonary:      Effort: Pulmonary effort is normal  No respiratory distress  Breath sounds: Normal breath sounds  Chest:      Chest wall: Tenderness (Left-sided rib tenderness and abrasion present ) present  Abdominal:      General: Abdomen is flat  Bowel sounds are normal  There is no distension  Palpations: Abdomen is soft  Tenderness: There is abdominal tenderness (Tenderness and abrasions present on the left side of abdomen  )  There is no right CVA tenderness or left CVA tenderness     Musculoskeletal:         General: Tenderness (Left lower ribs, left-sided abdomen, left buttock  Hips stable, no spinal tenderness) present  No deformity or signs of injury  Normal range of motion  Cervical back: Normal range of motion and neck supple  No rigidity or tenderness  Skin:     General: Skin is warm and dry  Findings: Erythema present  No bruising, lesion or rash  Neurological:      General: No focal deficit present  Mental Status: He is alert  Cranial Nerves: No cranial nerve deficit  Sensory: No sensory deficit           ED Medications  Medications   chlorhexidine (PERIDEX) 0 12 % oral rinse 15 mL (has no administration in time range)   acetaminophen (TYLENOL) tablet 975 mg (975 mg Oral Given 4/16/23 1702)   oxyCODONE (ROXICODONE) split tablet 2 5 mg (has no administration in time range)   oxyCODONE (ROXICODONE) IR tablet 5 mg (has no administration in time range)   HYDROmorphone HCl (DILAUDID) injection 0 2 mg (has no administration in time range)   senna-docusate sodium (SENOKOT S) 8 6-50 mg per tablet 1 tablet (has no administration in time range)   naloxone (NARCAN) 0 04 mg/mL syringe 0 04 mg (has no administration in time range)   methocarbamol (ROBAXIN) tablet 500 mg (has no administration in time range)   ondansetron (ZOFRAN) injection 4 mg (has no administration in time range)   acetaminophen (TYLENOL) tablet 975 mg (975 mg Oral Given 4/15/23 2303)   sodium chloride 0 9 % bolus 1,000 mL (0 mL Intravenous Stopped 4/16/23 0131)   ketorolac (TORADOL) injection 15 mg (15 mg Intravenous Given 4/15/23 2335)   iohexol (OMNIPAQUE) 350 MG/ML injection (SINGLE-DOSE) 100 mL (100 mL Intravenous Given 4/15/23 2347)       Diagnostic Studies  Results Reviewed     Procedure Component Value Units Date/Time    Hemoglobin and hematocrit, blood [[id number]]  (Normal) Collected: 04/16/23 0258    Lab Status: Final result Specimen: Blood from Arm, Right Updated: 04/16/23 0302     Hemoglobin 13 4 g/dL      Hematocrit 40 5 %     Urine culture [[id number]] Collected: 04/15/23 2334 Lab Status:  In process Specimen: Urine, Clean Catch Updated: 04/16/23 0011    Urine Microscopic [[id number]]  (Abnormal) Collected: 04/15/23 2334    Lab Status: Final result Specimen: Urine, Clean Catch Updated: 04/16/23 0001     RBC, UA 20-30 /hpf      WBC, UA 2-4 /hpf      Epithelial Cells Occasional /hpf      Bacteria, UA Occasional /hpf      MUCUS THREADS Occasional    UA w Reflex to Microscopic w Reflex to Culture [[id number]]  (Abnormal) Collected: 04/15/23 2334    Lab Status: Final result Specimen: Urine, Clean Catch Updated: 04/15/23 2350     Color, UA Light Yellow     Clarity, UA Clear     Specific Gravity, UA 1 025     pH, UA 5 5     Leukocytes, UA Negative     Nitrite, UA Negative     Protein, UA Trace mg/dl      Glucose, UA Negative mg/dl      Ketones, UA Trace mg/dl      Urobilinogen, UA 2 0 mg/dl      Bilirubin, UA Negative     Occult Blood, UA Large    Comprehensive metabolic panel [[id number]]  (Abnormal) Collected: 04/15/23 2304    Lab Status: Final result Specimen: Blood from Arm, Right Updated: 04/15/23 2329     Sodium 136 mmol/L      Potassium 4 0 mmol/L      Chloride 101 mmol/L      CO2 27 mmol/L      ANION GAP 8 mmol/L      BUN 19 mg/dL      Creatinine 1 22 mg/dL      Glucose 107 mg/dL      Calcium 10 1 mg/dL      AST 50 U/L      ALT 67 U/L      Alkaline Phosphatase 54 U/L      Total Protein 8 4 g/dL      Albumin 5 3 g/dL      Total Bilirubin 1 27 mg/dL      eGFR 76 ml/min/1 73sq m     Narrative:      [place] guidelines for Chronic Kidney Disease (CKD):   •  Stage 1 with normal or high GFR (GFR > 90 mL/min/1 73 square meters)  •  Stage 2 Mild CKD (GFR = 60-89 mL/min/1 73 square meters)  •  Stage 3A Moderate CKD (GFR = 45-59 mL/min/1 73 square meters)  •  Stage 3B Moderate CKD (GFR = 30-44 mL/min/1 73 square meters)  •  Stage 4 Severe CKD (GFR = 15-29 mL/min/1 73 square meters)  •  Stage 5 End Stage CKD (GFR <15 mL/min/1 73 square meters)  Note: GFR calculation is "accurate only with a steady state creatinine    Lipase [[id number]]  (Normal) Collected: 04/15/23 2304    Lab Status: Final result Specimen: Blood from Arm, Right Updated: 04/15/23 2329     Lipase 24 u/L     CBC and differential [[id number]]  (Abnormal) Collected: 04/15/23 2304    Lab Status: Final result Specimen: Blood from Arm, Right Updated: 04/15/23 2310     WBC 8 96 Thousand/uL      RBC 5 16 Million/uL      Hemoglobin 15 1 g/dL      Hematocrit 45 4 %      MCV 88 fL      MCH 29 3 pg      MCHC 33 3 g/dL      RDW 12 3 %      MPV 9 7 fL      Platelets 948 Thousands/uL      nRBC 0 /100 WBCs      Neutrophils Relative 74 %      Immat GRANS % 1 %      Lymphocytes Relative 13 %      Monocytes Relative 12 %      Eosinophils Relative 0 %      Basophils Relative 0 %      Neutrophils Absolute 6 61 Thousands/µL      Immature Grans Absolute 0 06 Thousand/uL      Lymphocytes Absolute 1 19 Thousands/µL      Monocytes Absolute 1 06 Thousand/µL      Eosinophils Absolute 0 01 Thousand/µL      Basophils Absolute 0 03 Thousands/µL                  CT chest abdomen pelvis w contrast   ED Interpretation by Lisette Marley MD (04/16 0240)   Addendum created by Andrei Robles MD on 4/16/2023 2:13 AM Eastern Time ([facility] [address] 1156):  THIS REPORT CONTAINS FINDINGS THAT MAY BE CRITICAL TO PATIENT CARE  Findings  were communicated via telephone with Nichelle Linda at 2:13 AM EDT on 4/16/2023  These findings were acknowledged and understood  Addendum created by Andrei Robles MD on 4/16/2023 2:07 AM Jonathan Maple Time ([address] 115): There was a software mapping error in the IMPRESSION section of the original report  It should also contain the following sentence:  \"Four separate acute left rib fractures  \"  Initial Report created on 4/16/2023 2:05 AM Jonathan Maple Time ([address] 1157):  PROCEDURE INFORMATION:  Exam: CT Chest With Contrast; Diagnostic  Exam date and time: 4/15/2023 11:45 PM  Age: 30years old  Clinical indication: Pain and injury or trauma;  Fall; " Luq; Abdominal pain; Localized; Blunt trauma  (contusions or hematomas); Chest wall pain and left-sided  TECHNIQUE:  Imaging protocol: Diagnostic computed tomography of the chest with c   ontrast   3D rendering (Not supervised by radiologist): MIP and/or 3D reconstructed images were created  by the technologist   Contrast material: OMNI 350; Contrast volume: 100 ml; Contrast route: INTRAVENOUS (IV);  COMPARISON:  DX XR RIBS LEFT W PA CHEST MIN 3 VWS 4/15/2023 11:06 PM  FINDINGS:  Lungs: No traumatic lung contusion or a discrete lung laceration  Pleural spaces: 2 separate subcentimeter foci of gas within the left pleura (series 301, image 72),  consistent with minimal pneumothoraces (in aggregate measuring less than 1% in volume)  No  pleural effusion to suggest hemothorax  No right pneumothorax  Heart: Unremarkable  No cardiomegaly  No pericardial effusion  Lymph nodes: Unremarkable  No enlarged lymph nodes  Vasculature: Although this study is not a dedicated PE-protocol CT angiogram, no central/large filling  defect in the pulmonary arteries to suggest PE  No aortic dissection  Bones/joints: Acute nondisplaced fractures in the posterior aspect of the left 10th th   rough 12th ribs  as well as another in the anterolateral aspect of the left 6th rib  Nestora Needle Accession: [id number] MRN: [id number]  Preliminary  Radiology Report  Page 2 of 3  Soft tissues: Unremarkable  IMPRESSION:  Two separate subcentimeter foci of gas within the left pleura (series 301, image 72), consistent with  minimal pneumothoraces (in aggregate measuring less than 1% in volume)   =========================  PROCEDURE INFORMATION:  Exam: CT Abdomen And Pelvis With Contrast  Exam date and time: 4/15/2023 11:45 PM  Age: 30years old  Clinical indication: Pain and injury or trauma; Fall; Luq; Abdominal pain; Localized; Blunt trauma  (contusions or hematomas);  Chest wall pain and left-sided  TECHNIQUE:  Imaging protocol: "Computed tomography of the abdomen and pelvis with contrast   3D rendering (Not supervised by radiologist): MIP and/or 3D reconstructed images were created  by the technologist   Contrast material: OMNI 350; Contrast volume: 100 ml; Contra   st route: INTRAVENOUS (IV);  COMPARISON:  DX XR RIBS LEFT W PA CHEST MIN 3 VWS 4/15/2023 11:06 PM  FINDINGS:  Liver: No mass or fluid collection  Gallbladder and bile ducts: No acute abnormality in this region  Pancreas: No acute inflamm  , mass or duct dilation  Spleen: Two separate lacerations in the spleen, larger one measuring 1 4 cm and the other one is  smaller, amorphous, series 301, images 82 to 85  Adrenal glands: Normal  No mass  Kidneys and ureters: Trace left perirenal fluid with no acute abnormality of the subjacent left kidney  (no renal contusion or laceration)  Right kidney is unremarkable  Stomach and bowel: No obstruction or mucosal thickening  Appendix: No abnormality of the well visualized appendix  Intraperitoneal space: No free air  No significant fluid collection  Vasculature: No AAA  Lymph nodes: No adenopathy  Urinary bladder: There is relative bladder wall thickening, which may be related to chronic partial  bladder outlet obstruction (e g  associate   d with the prostate); however, correlation with urinalysis may  be considered to exclude the possibility of cystitis (if clinically indicated)  Reproductive: See \"Urinary bladder\" finding  Bones/joints: No acute fracture or dislocation  Soft tissues: Unremarkable  Tu Garrison Accession: [id number] MRN: [id number]  Preliminary  Radiology Report   (QA) DISCREPANCY? If there is a discrepancy between the preliminary and final interpretation, please notify [employer] via [email or web address]    If you do not have access to our QA portal, call our QA team at [phone]  This report is intended only for the use of the referring physician, " and only in accordance with law, If you received this in error, call [phone]  Page 3 of 3  IMPRESSION:  1  Two separate lacerations in the spleen, larger one measuring 1 4 cm and the other one is smaller,  amorphous, series 301, images 82 to 85   2  Trace left perirenal fluid with no acu   te abnormality of the subjacent left kidney (no renal contusion  or laceration)  3  Bladder wall thickening, a nonspecific finding  Thank you for allowing us to participate in the care of your patient  Dictated and Authenticated by: Destiney Lopez MD  04/16/2023 2:05 AM Waqar Sims Time ([address]      XR ribs left w pa chest min 3 views   ED Interpretation by Chuck Phillips MD (04/16 1583)   My personal interpretation-no acute cardiopulmonary disease, no osseous abnormalities appreciated  XR chest portable    (Results Pending)         Procedures  Procedures      ED Course  ED Course as of 04/16/23 0720   Sat Apr 15, 2023   2314 CBC and differential(!)  Unremarkable   2314 Vitals reviewed, stable   Sun Apr 16, 2023   0017 Lipase  WNL   0017 Comprehensive metabolic panel(!)  Slightly elevated liver enzymes and bilirubin   0026 Urine Microscopic(!)  20-30 RBCs   0051 On reassessment, patient pain is well controlled on Tylenol and Toradol  0235 CT chest abdomen pelvis w contrast  IMPRESSION:  Two separate subcentimeter foci of gas within the left pleura (series 301, image 72), consistent with  minimal pneumothoraces (in aggregate measuring less than 1% in volume)  Two separate lacerations in the spleen, larger one measuring 1 4 cm and the other one is smaller,  amorphous, series 301, images 82 to 85  Trace left perirenal fluid with no acute abnormality of the subjacent left kidney (no renal contusion  or laceration)  Bladder wall thickening, a nonspecific finding  5341 Trauma surgery attending contacted and state they will evaluate patient  Pt informed of CT findings, states his pain is still well controlled  Vitals continue to be stable  Will continue to monitor  5749 Patient admitted to trauma service  MDM      Disposition  Final diagnoses:   Multiple rib fractures   Splenic laceration, initial encounter   Pneumothorax     Time reflects when diagnosis was documented in both MDM as applicable and the Disposition within this note     Time User Action Codes Description Comment    4/16/2023  2:21 AM Rocio Repress Add [S22 49XA] Multiple rib fractures     4/16/2023  2:21 AM Rocio Repress Add [R70 874P] Splenic laceration, initial encounter     4/16/2023  2:21 AM Rocio Repress Add [J93 [id number]] Pneumothorax     4/16/2023  3:02 AM Del Toro Cuna Add [S37 [id number]] Traumatic perinephric hematoma, left, initial encounter       ED Disposition     ED Disposition   Admit    Condition   Stable    Date/Time   Sun Apr 16, 2023  3:58 AM    Comment   Case was discussed with Dr Ilana Fraser and the patient's admission status was agreed to be Admission Status: inpatient status to the service of Dr Ilana Fraser   Follow-up Information    None         There are no discharge medications for this patient  No discharge procedures on file  PDMP Review       Value Time User    PDMP Reviewed  Yes 4/16/2023  2:58 AM Agustina Jerome           ED Provider  Attending physically available and evaluated Fela Osborne  PALAK managed the patient along with the ED Attending      Electronically Signed by         Penny Collier MD  04/16/23 4625

## 2023-04-16 NOTE — ASSESSMENT & PLAN NOTE
· 2/2 mechanical fall  · CT chest: Per radiology: Left subcentimeter foci of PTX (<1%)  · Currently on room air with SpO2 97%  · Maintain SpO2 > 94%  · Encourage IS

## 2023-04-17 ENCOUNTER — APPOINTMENT (INPATIENT)
Dept: RADIOLOGY | Facility: HOSPITAL | Age: 38
End: 2023-04-17

## 2023-04-17 VITALS
OXYGEN SATURATION: 98 % | DIASTOLIC BLOOD PRESSURE: 76 MMHG | WEIGHT: 158.73 LBS | SYSTOLIC BLOOD PRESSURE: 120 MMHG | RESPIRATION RATE: 17 BRPM | HEART RATE: 79 BPM | TEMPERATURE: 98.7 F | BODY MASS INDEX: 25.51 KG/M2 | HEIGHT: 66 IN

## 2023-04-17 LAB
ANION GAP SERPL CALCULATED.3IONS-SCNC: 6 MMOL/L (ref 4–13)
BACTERIA UR CULT: NORMAL
BUN SERPL-MCNC: 17 MG/DL (ref 5–25)
CALCIUM SERPL-MCNC: 8.8 MG/DL (ref 8.4–10.2)
CHLORIDE SERPL-SCNC: 105 MMOL/L (ref 96–108)
CO2 SERPL-SCNC: 27 MMOL/L (ref 21–32)
CREAT SERPL-MCNC: 1.01 MG/DL (ref 0.6–1.3)
ERYTHROCYTE [DISTWIDTH] IN BLOOD BY AUTOMATED COUNT: 12.4 % (ref 11.6–15.1)
GFR SERPL CREATININE-BSD FRML MDRD: 95 ML/MIN/1.73SQ M
GLUCOSE SERPL-MCNC: 102 MG/DL (ref 65–140)
HCT VFR BLD AUTO: 39.8 % (ref 36.5–49.3)
HGB BLD-MCNC: 13 G/DL (ref 12–17)
MCH RBC QN AUTO: 29.4 PG (ref 26.8–34.3)
MCHC RBC AUTO-ENTMCNC: 32.7 G/DL (ref 31.4–37.4)
MCV RBC AUTO: 90 FL (ref 82–98)
PLATELET # BLD AUTO: 156 THOUSANDS/UL (ref 149–390)
PMV BLD AUTO: 9.6 FL (ref 8.9–12.7)
POTASSIUM SERPL-SCNC: 4.3 MMOL/L (ref 3.5–5.3)
RBC # BLD AUTO: 4.42 MILLION/UL (ref 3.88–5.62)
SODIUM SERPL-SCNC: 138 MMOL/L (ref 135–147)
WBC # BLD AUTO: 4.83 THOUSAND/UL (ref 4.31–10.16)

## 2023-04-17 RX ORDER — AMOXICILLIN 250 MG
1 CAPSULE ORAL
Qty: 5 TABLET | Refills: 0 | Status: SHIPPED | OUTPATIENT
Start: 2023-04-17 | End: 2023-04-22

## 2023-04-17 RX ORDER — OXYCODONE HYDROCHLORIDE 5 MG/1
2.5-5 TABLET ORAL EVERY 4 HOURS PRN
Qty: 20 TABLET | Refills: 0 | Status: SHIPPED | OUTPATIENT
Start: 2023-04-17 | End: 2023-04-20

## 2023-04-17 RX ORDER — METHOCARBAMOL 500 MG/1
500 TABLET, FILM COATED ORAL EVERY 6 HOURS PRN
Qty: 56 TABLET | Refills: 0 | Status: SHIPPED | OUTPATIENT
Start: 2023-04-17 | End: 2023-05-01

## 2023-04-17 RX ORDER — ACETAMINOPHEN 325 MG/1
650 TABLET ORAL EVERY 4 HOURS PRN
Refills: 0
Start: 2023-04-17

## 2023-04-17 RX ADMIN — ACETAMINOPHEN 975 MG: 325 TABLET ORAL at 05:16

## 2023-04-17 RX ADMIN — ENOXAPARIN SODIUM 30 MG: 30 INJECTION SUBCUTANEOUS at 08:43

## 2023-04-17 NOTE — UTILIZATION REVIEW
Initial Clinical Review    Admission: Date/Time/Statement:   Admission Orders (From admission, onward)     Ordered        04/16/23 0335  Inpatient Admission  Once                      Orders Placed This Encounter   Procedures   • Inpatient Admission     Standing Status:   Standing     Number of Occurrences:   1     Order Specific Question:   Level of Care     Answer:   Level 1 Stepdown [13]     Order Specific Question:   Estimated length of stay     Answer:   More than 2 Midnights     Order Specific Question:   Certification     Answer:   I certify that inpatient services are medically necessary for this patient for a duration of greater than two midnights  See H&P and MD Progress Notes for additional information about the patient's course of treatment  ED Arrival Information     Expected   -    Arrival   4/15/2023 19:42    Acuity   Urgent            Means of arrival   Walk-In    Escorted by   Spouse    Service   Trauma    Admission type   Emergency            Arrival complaint   FALL (-HEAD STRIKE) , Norma 45           Chief Complaint   Patient presents with   • Fall     Pt presents after a fall down stairs hit left ribs, elbow and buttocks  Pt states he slipped when he fell  Denies LOC or head strike  Initial Presentation: 30 y o  male presents after a fall down about 10 steps,  with c/o left side chest, abdomen, pelvis pain, denies head strike, LOC  He reports he slipped on wooden stairs and fell on his left side down the stairs, landing on his hip and his left flank after which he slid down the stairs  GCS 15, with abrasions to the left chest wall with tenderness to palpation,  abd soft, non tender to palpation  CT showed 2 separate splenic lacerations, one measuring 1 4 cm and on smaller , trace L perinephric fluid with no acute abnormality of the subajacent L kidney, L 6th, 10-12 th rib fractures   He is admitted to ICU level of care, NPO, IVF's, trend Hg q6, hold chemical DVT ppx, repeat CXR, rib fx protocol, aggressive pulmonary toilet, multimodal pain control  Bedrest , PT eval  when stable  Date: 4/17/2023    Day 2:  No acute events overnight  VSS, Labs stable, F/U CXR no pneumothorax seen  Findings and follow up required for: Mild b/l gynecomastia, malignancy cannot be excluded, Tiny-fat containing periumbilical hernia,   will need short term OP follow up with PCP, within 2 weeks  ED Triage Vitals   Temperature Pulse Respirations Blood Pressure SpO2   04/15/23 1955 04/15/23 1953 04/15/23 1953 04/15/23 1953 04/15/23 1953   99 7 °F (37 6 °C) 102 18 138/72 98 %      Temp Source Heart Rate Source Patient Position - Orthostatic VS BP Location FiO2 (%)   04/15/23 1955 04/15/23 1953 04/15/23 1953 04/15/23 1953 --   Oral Monitor Lying Left arm       Pain Score       04/15/23 2335       8          Wt Readings from Last 1 Encounters:   04/17/23 72 kg (158 lb 11 7 oz)     Additional Vital Signs:   Date/Time Temp Pulse Resp BP MAP (mmHg) SpO2 O2 Device Patient Position - Orthostatic VS   04/17/23 0832 98 7 °F (37 1 °C) 79 17 120/76 93 98 % None (Room air) Lying   04/17/23 0803 -- -- -- -- -- 98 % None (Room air) --   04/16/23 2258 98 °F (36 7 °C) -- 18 118/72 85 -- None (Room air) Lying   04/16/23 1943 98 °F (36 7 °C) 80 16 116/77 92 98 % None (Room air) Lying   04/16/23 0739 -- -- -- -- -- 98 % None (Room air) --   04/16/23 0700 98 5 °F (36 9 °C) 64 14 115/68 85 99 % None (Room air) Lying   04/16/23 0500 98 7 °F (37 1 °C) -- -- -- -- -- None (Room air) Lying   04/16/23 0400 -- 72 18 118/66 88 98 % -- --   04/16/23 0300 -- 74 18 123/71 92 100 % -- --   04/16/23 0230 -- 74 18 116/61 83 100 % None (Room air) Lying   04/15/23 2303 99 2 °F (37 3 °C) 86 18 125/74 91 98 % None (Room air) Lying         Pertinent Labs/Diagnostic Test Results:   CT chest abdomen pelvis w contrast  4/15/23                 IMPRESSION:  1   Two separate lacerations in the spleen, larger one measuring 1 4 cm and the other one is smaller,  amorphous, series 301, images 82 to 85   2  Trace left perirenal fluid with no acu   te abnormality of the subjacent left kidney (no renal contusion  or laceration)  3  Bladder wall thickening, a nonspecific finding  Thank you for allowing us to participate in the care of your patient  Dictated and Authenticated by: Malissa Reynolds MD  04/16/2023 2:05 AM Jonathan Maple Time (Raimundo N[address] [place] 1159      Final Result by Meghana Early MD (33/67 0041)      Left-sided rib fractures as described  Tiny pleural air at the left lung base posteriorly compatible with a tiny pneumothorax possibly loculated  Grade 2 AAST splenic injury as described above  Grade 1 AAST left renal injury as described above  XR ribs left w pa chest min 3 views   ED Interpretation by Lisette Marley MD (04/16 1988)   My personal interpretation-no acute cardiopulmonary disease, no osseous abnormalities appreciated  Final Result by Shilpa King MD (24/24 2704)      No active cardiopulmonary disease  Fracture of the left 6th rib  Additional fractures described on the CT scan as well as a tiny left pneumothorax are not identified on the plain films  XR chest portable    4/17/23 -    No acute cardiopulmonary disease  The known left rib fractures are not well visualized radiographically  No pneumothorax identified       no ECG       Results from last 7 days   Lab Units 04/17/23  0516 04/16/23  1008 04/16/23  0547 04/16/23  0258 04/15/23  2304   WBC Thousand/uL 4 83  --  5 79  --  8 96   HEMOGLOBIN g/dL 13 0 13 5 13 3 13 4 15 1   HEMATOCRIT % 39 8 40 4 40 1 40 5 45 4   PLATELETS Thousands/uL 156  --  177  --  208   NEUTROS ABS Thousands/µL  --   --  3 29  --  6 61         Results from last 7 days   Lab Units 04/17/23  0516 04/16/23  0547 04/15/23  2304   SODIUM mmol/L 138 137 136   POTASSIUM mmol/L 4 3 3 9 4 0   CHLORIDE mmol/L 105 105 101   CO2 mmol/L 27 26 27   ANION GAP mmol/L 6 6 8   BUN mg/dL 17 17 19   CREATININE mg/dL 1 01 1 04 1 22   EGFR ml/min/1 73sq m 95 92 76   CALCIUM mg/dL 8 8 9 0 10 1   MAGNESIUM mg/dL  --  2 1  --    PHOSPHORUS mg/dL  --  3 8  --      Results from last 7 days   Lab Units 04/16/23  0547 04/15/23  2304   AST U/L 36 50*   ALT U/L 49 67*   ALK PHOS U/L 45 54   TOTAL PROTEIN g/dL 6 7 8 4   ALBUMIN g/dL 4 3 5 3*   TOTAL BILIRUBIN mg/dL 1 57* 1 27*         Results from last 7 days   Lab Units 04/17/23  0516 04/16/23  0547 04/15/23  2304   GLUCOSE RANDOM mg/dL 102 105 107       Results from last 7 days   Lab Units 04/16/23  0547   PROTIME seconds 13 2   INR  0 98   PTT seconds 27       Results from last 7 days   Lab Units 04/15/23  2304   LIPASE u/L 24       Results from last 7 days   Lab Units 04/15/23  2334   CLARITY UA  Clear   COLOR UA  Light Yellow   SPEC GRAV UA  1 025   PH UA  5 5   GLUCOSE UA mg/dl Negative   KETONES UA mg/dl Trace*   BLOOD UA  Large*   PROTEIN UA mg/dl Trace*   NITRITE UA  Negative   BILIRUBIN UA  Negative   UROBILINOGEN UA (BE) mg/dl 2 0*   LEUKOCYTES UA  Negative   WBC UA /hpf 2-4*   RBC UA /hpf 20-30*   BACTERIA UA /hpf Occasional   EPITHELIAL CELLS WET PREP /hpf Occasional   MUCUS THREADS  Occasional*       Results from last 7 days   Lab Units 04/15/23  2334   URINE CULTURE  <10,000 cfu/ml         ED Treatment:   Medication Administration from 04/15/2023 1942 to 04/16/2023 4966       Date/Time Order Dose Route Action Comments     04/15/2023 2303 EDT acetaminophen (TYLENOL) tablet 975 mg 975 mg Oral Given --     04/15/2023 2300 EDT sodium chloride 0 9 % bolus 1,000 mL 1,000 mL Intravenous New Bag --     04/15/2023 2335 EDT ketorolac (TORADOL) injection 15 mg 15 mg Intravenous Given --     04/15/2023 2347 EDT iohexol (OMNIPAQUE) 350 MG/ML injection (SINGLE-DOSE) 100 mL 100 mL Intravenous Given --        History reviewed  No pertinent past medical history    Present on Admission:  • Multiple fractures of ribs, left side, initial encounter for closed fracture  • Spleen laceration, initial encounter  • Fall (on) (from) other stairs and steps, initial encounter  • Hematuria  • Traumatic pneumothorax      Admitting Diagnosis: Head pain [R51 9]  Multiple rib fractures [S22 49XA]  Splenic laceration, initial encounter [S36 039A]  Pneumothorax [J93 9]  Traumatic perinephric hematoma, left, initial encounter [S37 092A]  Age/Sex: 30 y o  male       Admission Orders:  Scheduled Medications:  acetaminophen, 975 mg, Oral, Q8H LIV  enoxaparin, 30 mg, Subcutaneous, Q12H [address]- started 4/16 pm   senna-docusate sodium, 1 tablet, Oral, HS      Continuous IV Infusions:     PRN Meds:  HYDROmorphone, 0 2 mg, Intravenous, Q2H PRN  methocarbamol, 500 mg, Oral, Q6H PRN-4/16 x 1  naloxone, 0 04 mg, Intravenous, Q1MIN PRN  ondansetron, 4 mg, Intravenous, Q4H PRN  oxyCODONE, 2 5 mg, Oral, Q4H PRN-4/16 x  2   Or  oxyCODONE, 5 mg, Oral, Q4H PRN      Nursing Orders - Rib fx protocol - fall precautions - up with assistance - turn q2 - OOB to chair tid - I/O q 4 - SCD's to le's - Diet regular house  - PT eval       Network Utilization Review Department  ATTENTION: Please call with any questions or concerns to [phone] and carefully listen to the prompts so that you are directed to the right person  All voicemails are confidential   [facility] all requests for admission clinical reviews, approved or denied determinations and any other requests to dedicated fax number below belonging to the campus where the patient is receiving treatment   List of dedicated fax numbers for the Facilities:  [address] [place] [address] DENIALS (Administrative/Medical Necessity) [phone]   [address] [place] [address] (Maternity/NICU/Pediatrics) [phone]   6 Fiona Fern[facility]dez [facility] [address] [place] [address] Pepe Broderick [facility] [address] [facility] 134 051 [facility] [phone]

## 2023-04-17 NOTE — ASSESSMENT & PLAN NOTE
- Tiny traumatic pneumothorax identified on CT CAP  - Repeat CXR without increased pneumothorax  - Currently on room air breathing well without any issues  - Supplemental O2 as needed  - Rib fracture protocol  - Follow up in trauma clinic in 2 weeks

## 2023-04-17 NOTE — INCIDENTAL FINDINGS
The following findings require follow up:  Radiographic finding     Findings and Follow up required:       1) Mild bilateral gynecomastia (enlarged breast tissue)  A malignancy (cancer) cannot be completely excluded based on trauma imaging alone  Recommend short-term outpatient follow-up with primary care provider to review the finding and for further surveillance as indicated  2) Tiny fat-containing periumbilical hernia  No additional work up or intervention is necessary for this finding at this time  Recommend short-term outpatient follow-up with primary care provider to review the finding and for further surveillance as indicated  Follow up should be done within 2 week(s)    The above noted incidental findings were discussed with the patient via an interpretor  The patient via an interpretor demonstrated understanding of the findings and the follow-up recommendations      Please notify the following clinician to assist with the follow up:   Primary Care Provider

## 2023-04-17 NOTE — ASSESSMENT & PLAN NOTE
- Multiple left-sided rib fractures (6, 10-12), present on admission   - Continue rib fracture protocol   - Continue to encourage incentive spirometer use and adequate pulmonary hygiene  Currently pulling 1500 mL on I S   - PIC score   - Continue multimodal analgesic regimen    - Supplemental oxygen via nasal cannula as needed to maintain saturations greater than or equal to 94%  - Repeat chest x-ray without acute cardiopulmonary process, no increase in pneumothorax noted on CT   - PT and OT evaluation and treatment as indicated  - Outpatient follow-up in the trauma clinic for re-evaluation in approximately 2 weeks

## 2023-04-17 NOTE — DISCHARGE SUMMARY
Discharge Summary - Trauma Service   Reinaldo Ocampo 30 y o  male MRN: [id number]  Unit/Bed#: ICU 03 Encounter: [id number]    Admission Date: 4/15/2023     Discharge Date: 4/17/2023      Admitting Diagnosis: Head pain [R51 9]  Multiple rib fractures [S22 49XA]  Splenic laceration, initial encounter [S36 039A]  Pneumothorax [J93 9]  Traumatic perinephric hematoma, left, initial encounter [S37 092A]    Discharge Diagnosis:     1  Status post fall downstairs  2   Splenic lacerations x2   3   Left renal contusion/perinephric hematoma  4   Multiple left-sided rib fractures  5   Tiny left pneumothorax  6   Multiple traumatic abrasions  7   Acute pain secondary to traumatic injuries  Attending and Service: Dr Luis A Harmon, Acute Care Surgical Services  Consulting Physician(s): None  Imaging and Procedures Performed:     XR chest portable    Result Date: 4/17/2023  Impression: No acute cardiopulmonary disease  The known left rib fractures are not well visualized radiographically  No pneumothorax identified  Workstation performed: [id number]     XR ribs left w pa chest min 3 views    Result Date: 4/17/2023  Impression: No active cardiopulmonary disease  Fracture of the left 6th rib  Additional fractures described on the CT scan as well as a tiny left pneumothorax are not identified on the plain films  Workstation performed: [id number]     CT chest abdomen pelvis w contrast    Result Date: 4/16/2023  Impression: Left-sided rib fractures as described  Tiny pleural air at the left lung base posteriorly compatible with a tiny pneumothorax possibly loculated  Grade 2 AAST splenic injury as described above  Grade 1 AAST left renal injury as described above   Findings are consistent with the preliminary report from [facility] which was provided shortly after completion of the exam  Workstation performed: [id number]       Hospital Course: Reinaldo Ocampo is a 30-year-old male who presented to the emergency department following a fall down approximately 11 wooden stairs  During his ER work-up he was found to have multiple traumatic injuries  Upon his initial trauma evaluation, he noted that he slipped on the stairs and fell down presenting with mostly left-sided chest wall pain  He denied hitting his head or losing consciousness  During his initial trauma evaluation, his primary survey was unremarkable  On secondary survey, he was tachycardic with normal vital signs otherwise; he had left chest wall tenderness as well as left CVA tenderness and flank tenderness with overlying abrasion and ecchymosis; the remainder of his exam is unremarkable  His initial work-up included labs and the above and imaging studies  He is admitted to the trauma service status post fall down multiple wooden stairs with multiple left-sided rib fractures and an associated traumatic left pneumothorax, left splenic lacerations x2, left kidney contusion with small perinephric hematoma and associated hematuria, and pain secondary to his traumatic injuries  He was treated conservatively for his solid organ injuries and noted resolution of his hematuria during his hospital encounter  He was placed on the rib fracture protocol as well as a multimodal analgesic regimen and instructed on incentive spirometer use and adequate pulmonary hygiene  He had serial evaluations and abdominal exam is as well as serial monitoring of his hemoglobin and hemodynamic status  Throughout his hospital encounter, his abdominal exam remained benign and he had no evidence of active or ongoing bleeding based on imaging, physical exam and serial laboratory studies  His diet was advanced as tolerated  Once tolerating an oral diet, he was transitioned to an oral analgesic regimen  He was ultimately deemed appropriate for discharge home on 4/17/2023  For further details of his hospital encounter, please see his complete medical records      On discharge, the patient is instructed to follow-up with the patient's primary care provider to review the events of the patient's recent hospitalization as well as incidental findings that were disclosed to him and his wife via a [place]  The patient is instructed to follow-up in the Trauma Clinic in 2 to 3 weeks for reevaluation of his multiple traumatic injuries  The patient should follow the provided discharge instructions  Condition at Discharge: stable     Discharge instructions/Information to patient and family:   See after visit summary for information provided to patient and family  Provisions for Follow-Up Care:  See after visit summary for information related to follow-up care and any pertinent home health orders  Disposition: See After Visit Summary for discharge disposition information  Planned Readmission: No    Discharge Statement   I spent 30 minutes discharging the patient  This time was spent on the day of discharge  I had direct contact with the patient on the day of discharge  Additional documentation is required if more than 30 minutes were spent on discharge  Discharge Medications:  See after visit summary for reconciled discharge medications provided to patient and family        Merline Somerset, PA-C   4/17/2023  12:03 PM Acitretin Counseling:  I discussed with the patient the risks of acitretin including but not limited to hair loss, dry lips/skin/eyes, liver damage, hyperlipidemia, depression/suicidal ideation, photosensitivity.  Serious rare side effects can include but are not limited to pancreatitis, pseudotumor cerebri, bony changes, clot formation/stroke/heart attack.  Patient understands that alcohol is contraindicated since it can result in liver toxicity and significantly prolong the elimination of the drug by many years.

## 2023-04-17 NOTE — DISCHARGE INSTR - AVS FIRST PAGE
Traumatic Rib Fracture and Solid Organ Injury Discharge Instructions:    - Your rib fractures will take time to heal  Rib fractures typically take at least 6-8 weeks to heal and may take longer     - Your accident or injury caused a laceration (cut) and /or bruising of your left kidney and spleen  Bleeding may have occurred internally  The bleeding stops as a clot begins to form within the injured area  It is extremely important that you follow the instructions given to you by your doctors and nurses  You must limit your physical activity as instructed or you risk disrupting the clot that has formed within your injured organ  Serious internal bleeding may result  Activity:  - Walking and normal light activities are encouraged  Normal daily activities including climbing steps are okay  - Avoid lifting greater than 20 pounds, any strenuous activities and/or exercise, and contact sports until cleared by the trauma service  - Avoid driving and crowded places until cleared by the trauma service  - Continue using the incentive spirometer at least 10 times every hour while awake  Return to work:    - You may return to work once you are cleared by the trauma service  Medications:    - You should continue your current medication regimen after discharge unless otherwise instructed  Please refer to your discharge medication list for further details  - Please take the pain medications as directed  - You are encouraged to use non-narcotic pain medications first and whenever possible  Reserve the use of narcotic pain medication for moderate to severe pain not controlled by non-narcotic medications   - No driving while taking narcotic pain medications  - You may become constipated, especially if taking pain medications  You may take any over the counter stool softeners or laxatives as needed  Examples: Milk of Magnesia, Colace, Senna      Additional Instructions:  - If you have any questions or concerns after discharge please call the office   - Call office or return to ER if fever greater than 101, chills, worsening/uncontrollable pain, develop productive cough, increasing shortness of breath, and/or difficulty breathing

## 2023-04-17 NOTE — PLAN OF CARE
Problem: PAIN - ADULT  Goal: Verbalizes/displays adequate comfort level or baseline comfort level  Description: Interventions:  - Encourage patient to monitor pain and request assistance  - Assess pain using appropriate pain scale  - Administer analgesics based on type and severity of pain and evaluate response  - Implement non-pharmacological measures as appropriate and evaluate response  - Consider cultural and social influences on pain and pain management  - Notify physician/advanced practitioner if interventions unsuccessful or patient reports new pain  Outcome: Progressing     Problem: INFECTION - ADULT  Goal: Absence or prevention of progression during hospitalization  Description: INTERVENTIONS:  - Assess and monitor for signs and symptoms of infection  - Monitor lab/diagnostic results  - Monitor all insertion sites, i e  indwelling lines, tubes, and drains  - Monitor endotracheal if appropriate and nasal secretions for changes in amount and color  - Rhame appropriate cooling/warming therapies per order  - Administer medications as ordered  - Instruct and encourage patient and family to use good hand hygiene technique  - Identify and instruct in appropriate isolation precautions for identified infection/condition  Outcome: Progressing     Problem: SAFETY ADULT  Goal: Patient will remain free of falls  Description: INTERVENTIONS:  - Educate patient/family on patient safety including physical limitations  - Instruct patient to call for assistance with activity   - Consult OT/PT to assist with strengthening/mobility   - Keep Call bell within reach  - Keep bed low and locked with side rails adjusted as appropriate  - Keep care items and personal belongings within reach  - Initiate and maintain comfort rounds  - Make Fall Risk Sign visible to staff  - Apply yellow socks and bracelet for high fall risk patients  - Consider moving patient to room near nurses station  Outcome: Progressing  Goal: Maintain or return to baseline ADL function  Description: INTERVENTIONS:  -  Assess patient's ability to carry out ADLs; assess patient's baseline for ADL function and identify physical deficits which impact ability to perform ADLs (bathing, care of mouth/teeth, toileting, grooming, dressing, etc )  - Assess/evaluate cause of self-care deficits   - Assess range of motion  - Assess patient's mobility; develop plan if impaired  - Assess patient's need for assistive devices and provide as appropriate  - Encourage maximum independence but intervene and supervise when necessary  - Involve family in performance of ADLs  - Assess for home care needs following discharge   - Consider OT consult to assist with ADL evaluation and planning for discharge  - Provide patient education as appropriate  Outcome: Progressing  Goal: Maintains/Returns to pre admission functional level  Description: INTERVENTIONS:  - Perform BMAT or MOVE assessment daily    - Set and communicate daily mobility goal to care team and patient/family/caregiver     - Collaborate with rehabilitation services on mobility goals if consulted  - Out of bed for toileting  - Record patient progress and toleration of activity level   Outcome: Progressing     Problem: SAFETY ADULT  Goal: Patient will remain free of falls  Description: INTERVENTIONS:  - Educate patient/family on patient safety including physical limitations  - Instruct patient to call for assistance with activity   - Consult OT/PT to assist with strengthening/mobility   - Keep Call bell within reach  - Keep bed low and locked with side rails adjusted as appropriate  - Keep care items and personal belongings within reach  - Initiate and maintain comfort rounds  - Make Fall Risk Sign visible to staff  - Apply yellow socks and bracelet for high fall risk patients  - Consider moving patient to room near nurses station  Outcome: Progressing  Goal: Maintain or return to baseline ADL function  Description: INTERVENTIONS:  -  Assess patient's ability to carry out ADLs; assess patient's baseline for ADL function and identify physical deficits which impact ability to perform ADLs (bathing, care of mouth/teeth, toileting, grooming, dressing, etc )  - Assess/evaluate cause of self-care deficits   - Assess range of motion  - Assess patient's mobility; develop plan if impaired  - Assess patient's need for assistive devices and provide as appropriate  - Encourage maximum independence but intervene and supervise when necessary  - Involve family in performance of ADLs  - Assess for home care needs following discharge   - Consider OT consult to assist with ADL evaluation and planning for discharge  - Provide patient education as appropriate  Outcome: Progressing  Goal: Maintains/Returns to pre admission functional level  Description: INTERVENTIONS:  - Perform BMAT or MOVE assessment daily    - Set and communicate daily mobility goal to care team and patient/family/caregiver     - Collaborate with rehabilitation services on mobility goals if consulted  - Out of bed for toileting  - Record patient progress and toleration of activity level   Outcome: Progressing

## 2023-04-17 NOTE — ASSESSMENT & PLAN NOTE
- Mechanical fall down 11 wooden stairs  - Admit to critical care for injuries as listed  - Pt is ambulating without assistance

## 2023-04-17 NOTE — NURSING NOTE
Pt discharged at this time  Discharge instructions and med list reviewed with patient  All IV's removed  Walked out with wife

## 2023-04-17 NOTE — ASSESSMENT & PLAN NOTE
- Two splenic lacerations, present on admission   - No active extravasation on CT imaging including delays  No IR intervention needed at this time  - S/p fall down about 11 wooden stairs  - Continue to monitor abdominal exam  Currently tender left flank, nontender abdomen  - Regular diet  - No evidence of active or ongoing bleeding at this time  - Continue multimodal analgesic regimen    - Continue to encourage incentive spirometer use and adequate pulmonary hygiene  Currently pulling 1500 mL on I S   - May resume light activity as tolerated while following solid organ injury precautions:  Avoid lifting greater than 10 pounds, any strenuous activities and/or exercise, and contact sports until cleared by the trauma service  Avoid driving and crowded places until cleared by the trauma service  - PT and OT evaluation and treatment as indicated  - Outpatient follow-up in the trauma clinic for re-evaluation in approximately 2 weeks